# Patient Record
(demographics unavailable — no encounter records)

---

## 2024-10-10 NOTE — PHYSICAL EXAM
[No Acute Distress] : no acute distress [Well Nourished] : well nourished [Well Developed] : well developed [Well-Appearing] : well-appearing [Normal Sclera/Conjunctiva] : normal sclera/conjunctiva [PERRL] : pupils equal round and reactive to light [EOMI] : extraocular movements intact [Normal Oropharynx] : the oropharynx was normal [No JVD] : no jugular venous distention [No Lymphadenopathy] : no lymphadenopathy [Supple] : supple [Thyroid Normal, No Nodules] : the thyroid was normal and there were no nodules present [No Respiratory Distress] : no respiratory distress  [No Accessory Muscle Use] : no accessory muscle use [Clear to Auscultation] : lungs were clear to auscultation bilaterally [Normal Rate] : normal rate  [Regular Rhythm] : with a regular rhythm [Normal S1, S2] : normal S1 and S2 [No Murmur] : no murmur heard [No Carotid Bruits] : no carotid bruits [No Abdominal Bruit] : a ~M bruit was not heard ~T in the abdomen [No Varicosities] : no varicosities [Pedal Pulses Present] : the pedal pulses are present [No Edema] : there was no peripheral edema [No Palpable Aorta] : no palpable aorta [No Extremity Clubbing/Cyanosis] : no extremity clubbing/cyanosis [Soft] : abdomen soft [Non Tender] : non-tender [Non-distended] : non-distended [No Masses] : no abdominal mass palpated [No HSM] : no HSM [Normal Bowel Sounds] : normal bowel sounds [Normal Posterior Cervical Nodes] : no posterior cervical lymphadenopathy [Normal Anterior Cervical Nodes] : no anterior cervical lymphadenopathy [No CVA Tenderness] : no CVA  tenderness [No Spinal Tenderness] : no spinal tenderness [No Joint Swelling] : no joint swelling [Grossly Normal Strength/Tone] : grossly normal strength/tone [No Rash] : no rash [Coordination Grossly Intact] : coordination grossly intact [No Focal Deficits] : no focal deficits [Normal Gait] : normal gait [Deep Tendon Reflexes (DTR)] : deep tendon reflexes were 2+ and symmetric [Normal Affect] : the affect was normal [Normal Insight/Judgement] : insight and judgment were intact [de-identified] : clear nasal discharge

## 2024-10-10 NOTE — PHYSICAL EXAM
[No Acute Distress] : no acute distress [Well Nourished] : well nourished [Well Developed] : well developed [Well-Appearing] : well-appearing [Normal Sclera/Conjunctiva] : normal sclera/conjunctiva [PERRL] : pupils equal round and reactive to light [EOMI] : extraocular movements intact [Normal Oropharynx] : the oropharynx was normal [No JVD] : no jugular venous distention [No Lymphadenopathy] : no lymphadenopathy [Supple] : supple [Thyroid Normal, No Nodules] : the thyroid was normal and there were no nodules present [No Respiratory Distress] : no respiratory distress  [No Accessory Muscle Use] : no accessory muscle use [Clear to Auscultation] : lungs were clear to auscultation bilaterally [Normal Rate] : normal rate  [Regular Rhythm] : with a regular rhythm [Normal S1, S2] : normal S1 and S2 [No Murmur] : no murmur heard [No Carotid Bruits] : no carotid bruits [No Abdominal Bruit] : a ~M bruit was not heard ~T in the abdomen [No Varicosities] : no varicosities [Pedal Pulses Present] : the pedal pulses are present [No Edema] : there was no peripheral edema [No Palpable Aorta] : no palpable aorta [No Extremity Clubbing/Cyanosis] : no extremity clubbing/cyanosis [Soft] : abdomen soft [Non Tender] : non-tender [Non-distended] : non-distended [No Masses] : no abdominal mass palpated [No HSM] : no HSM [Normal Bowel Sounds] : normal bowel sounds [Normal Posterior Cervical Nodes] : no posterior cervical lymphadenopathy [Normal Anterior Cervical Nodes] : no anterior cervical lymphadenopathy [No CVA Tenderness] : no CVA  tenderness [No Spinal Tenderness] : no spinal tenderness [No Joint Swelling] : no joint swelling [Grossly Normal Strength/Tone] : grossly normal strength/tone [No Rash] : no rash [Coordination Grossly Intact] : coordination grossly intact [No Focal Deficits] : no focal deficits [Normal Gait] : normal gait [Deep Tendon Reflexes (DTR)] : deep tendon reflexes were 2+ and symmetric [Normal Affect] : the affect was normal [Normal Insight/Judgement] : insight and judgment were intact [de-identified] : clear nasal discharge

## 2024-10-10 NOTE — HISTORY OF PRESENT ILLNESS
[FreeTextEntry1] : follow up [de-identified] : 10/10/24: Patient in office for flu shot & Metformin refill. Patient is in her usual state of health w/ no reported complaints. Is coming in for CPE in December  Patient was evaluated and treated for URI symptoms at  on 7/25. Symptoms are improving, and patient covid neg by group home, Today, however, multiple people have been found positive in the group home Patient has loss of taste. Patient has mild uri symptoms

## 2024-10-10 NOTE — HISTORY OF PRESENT ILLNESS
[FreeTextEntry1] : follow up [de-identified] : 10/10/24: Patient in office for flu shot & Metformin refill. Patient is in her usual state of health w/ no reported complaints. Is coming in for CPE in December  Patient was evaluated and treated for URI symptoms at  on 7/25. Symptoms are improving, and patient covid neg by group home, Today, however, multiple people have been found positive in the group home Patient has loss of taste. Patient has mild uri symptoms

## 2024-12-19 NOTE — HEALTH RISK ASSESSMENT
[No] : No [Good] : ~his/her~  mood as  good [No falls in past year] : Patient reported no falls in the past year [0] : 2) Feeling down, depressed, or hopeless: Not at all (0) [de-identified] : limited [de-identified] : ok [Never] : Never [NO] : No [Change in mental status noted] : No change in mental status noted [Language] : denies difficulty with language [Handling Complex Tasks] : denies difficulty handling complex tasks [Single] : single [Sexually Active] : not sexually active [Feels Safe at Home] : Feels safe at home [Fully functional (bathing, dressing, toileting, transferring, walking, feeding)] : Fully functional (bathing, dressing, toileting, transferring, walking, feeding) [Reports changes in hearing] : Reports no changes in hearing [Reports changes in vision] : Reports no changes in vision [Reports changes in dental health] : Reports no changes in dental health [Travel to Developing Areas] : does not  travel to developing areas [MammogramDate] : 02/24 [BoneDensityDate] : 10/22 [de-identified] : in group home [de-identified] : in group home

## 2024-12-19 NOTE — HEALTH RISK ASSESSMENT
[No] : No [Good] : ~his/her~  mood as  good [No falls in past year] : Patient reported no falls in the past year [0] : 2) Feeling down, depressed, or hopeless: Not at all (0) [de-identified] : limited [de-identified] : ok [Never] : Never [NO] : No [Change in mental status noted] : No change in mental status noted [Language] : denies difficulty with language [Handling Complex Tasks] : denies difficulty handling complex tasks [Single] : single [Sexually Active] : not sexually active [Feels Safe at Home] : Feels safe at home [Fully functional (bathing, dressing, toileting, transferring, walking, feeding)] : Fully functional (bathing, dressing, toileting, transferring, walking, feeding) [Reports changes in hearing] : Reports no changes in hearing [Reports changes in vision] : Reports no changes in vision [Reports changes in dental health] : Reports no changes in dental health [Travel to Developing Areas] : does not  travel to developing areas [MammogramDate] : 02/24 [BoneDensityDate] : 10/22 [de-identified] : in group home [de-identified] : in group home

## 2024-12-19 NOTE — PHYSICAL EXAM
[No Acute Distress] : no acute distress [Well Nourished] : well nourished [Well Developed] : well developed [Well-Appearing] : well-appearing [Normal Sclera/Conjunctiva] : normal sclera/conjunctiva [PERRL] : pupils equal round and reactive to light [EOMI] : extraocular movements intact [Normal Outer Ear/Nose] : the outer ears and nose were normal in appearance [Normal Oropharynx] : the oropharynx was normal [Normal TMs] : both tympanic membranes were normal [No JVD] : no jugular venous distention [No Lymphadenopathy] : no lymphadenopathy [Supple] : supple [Thyroid Normal, No Nodules] : the thyroid was normal and there were no nodules present [No Respiratory Distress] : no respiratory distress  [No Accessory Muscle Use] : no accessory muscle use [Clear to Auscultation] : lungs were clear to auscultation bilaterally [Normal Rate] : normal rate  [Regular Rhythm] : with a regular rhythm [Normal S1, S2] : normal S1 and S2 [No Murmur] : no murmur heard [No Abdominal Bruit] : a ~M bruit was not heard ~T in the abdomen [No Varicosities] : no varicosities [Pedal Pulses Present] : the pedal pulses are present [No Edema] : there was no peripheral edema [No Palpable Aorta] : no palpable aorta [No Extremity Clubbing/Cyanosis] : no extremity clubbing/cyanosis [Normal Appearance] : normal in appearance [No Nipple Discharge] : no nipple discharge [No Axillary Lymphadenopathy] : no axillary lymphadenopathy [Soft] : abdomen soft [Non Tender] : non-tender [Non-distended] : non-distended [No Masses] : no abdominal mass palpated [No HSM] : no HSM [Normal Bowel Sounds] : normal bowel sounds [Normal Supraclavicular Nodes] : no supraclavicular lymphadenopathy [Normal Axillary Nodes] : no axillary lymphadenopathy [Normal Posterior Cervical Nodes] : no posterior cervical lymphadenopathy [Normal Anterior Cervical Nodes] : no anterior cervical lymphadenopathy [No CVA Tenderness] : no CVA  tenderness [No Spinal Tenderness] : no spinal tenderness [No Joint Swelling] : no joint swelling [Grossly Normal Strength/Tone] : grossly normal strength/tone [No Rash] : no rash [Coordination Grossly Intact] : coordination grossly intact [No Focal Deficits] : no focal deficits [Normal Gait] : normal gait [Normal Affect] : the affect was normal [Alert and Oriented x3] : oriented to person, place, and time [Normal Insight/Judgement] : insight and judgment were intact [de-identified] : tremors

## 2024-12-19 NOTE — HISTORY OF PRESENT ILLNESS
[Formal Caregiver] : formal caregiver [FreeTextEntry1] : 77 year old female who presents today for a complete physical exam. states has been having reduced appetite; attributes to recent uri states hx of seasonal allergies feeling well overall today

## 2024-12-19 NOTE — PHYSICAL EXAM
[No Acute Distress] : no acute distress [Well Nourished] : well nourished [Well Developed] : well developed [Well-Appearing] : well-appearing [Normal Sclera/Conjunctiva] : normal sclera/conjunctiva [PERRL] : pupils equal round and reactive to light [EOMI] : extraocular movements intact [Normal Outer Ear/Nose] : the outer ears and nose were normal in appearance [Normal Oropharynx] : the oropharynx was normal [Normal TMs] : both tympanic membranes were normal [No JVD] : no jugular venous distention [No Lymphadenopathy] : no lymphadenopathy [Supple] : supple [Thyroid Normal, No Nodules] : the thyroid was normal and there were no nodules present [No Respiratory Distress] : no respiratory distress  [No Accessory Muscle Use] : no accessory muscle use [Clear to Auscultation] : lungs were clear to auscultation bilaterally [Normal Rate] : normal rate  [Regular Rhythm] : with a regular rhythm [Normal S1, S2] : normal S1 and S2 [No Murmur] : no murmur heard [No Abdominal Bruit] : a ~M bruit was not heard ~T in the abdomen [No Varicosities] : no varicosities [Pedal Pulses Present] : the pedal pulses are present [No Edema] : there was no peripheral edema [No Palpable Aorta] : no palpable aorta [No Extremity Clubbing/Cyanosis] : no extremity clubbing/cyanosis [Normal Appearance] : normal in appearance [No Nipple Discharge] : no nipple discharge [No Axillary Lymphadenopathy] : no axillary lymphadenopathy [Soft] : abdomen soft [Non Tender] : non-tender [Non-distended] : non-distended [No Masses] : no abdominal mass palpated [No HSM] : no HSM [Normal Bowel Sounds] : normal bowel sounds [Normal Supraclavicular Nodes] : no supraclavicular lymphadenopathy [Normal Axillary Nodes] : no axillary lymphadenopathy [Normal Posterior Cervical Nodes] : no posterior cervical lymphadenopathy [Normal Anterior Cervical Nodes] : no anterior cervical lymphadenopathy [No CVA Tenderness] : no CVA  tenderness [No Spinal Tenderness] : no spinal tenderness [No Joint Swelling] : no joint swelling [Grossly Normal Strength/Tone] : grossly normal strength/tone [No Rash] : no rash [Coordination Grossly Intact] : coordination grossly intact [No Focal Deficits] : no focal deficits [Normal Gait] : normal gait [Normal Affect] : the affect was normal [Alert and Oriented x3] : oriented to person, place, and time [Normal Insight/Judgement] : insight and judgment were intact [de-identified] : tremors

## 2025-01-29 NOTE — ASSESSMENT
[FreeTextEntry1] : - For acute pain will administer cortisone injections to the right greater trochanteric bursa distribution -Ice and as needed mobic (Rx provided)   The patient's orthopaedic condition(s) warrants intermittent use of a prescription strength non-steroidal anti-inflammatory medication.  These medications are associated with risks including but not limited to gastrointestinal irritation, kidney damage, hypertension, and bleeding.  The patient understands and will take medications as prescribed.  The patient will stop the medication and consult a physician as needed if problems arise. Follow-up as needed

## 2025-01-29 NOTE — IMAGING
[Disc space narrowing] : Disc space narrowing [FreeTextEntry1] : Multilevel degenerative findings with narrowing and anterior osteophyte formation noted from L 2-L5-S1 [de-identified] : normal

## 2025-01-29 NOTE — HISTORY OF PRESENT ILLNESS
[de-identified] :  01/29/2025: Known history of lumbar DDD and right hip bursitis.  She has seen Dr. Jerez in the past and did well with a cortisone injection to the trochanteric bursal region.  She states 1 day history of pain in the right greater trochanteric distribution and she is ambulating with a limp. She presents with a caregiver and she is requesting a cortisone injection to the right hip today [FreeTextEntry1] : Right hip [FreeTextEntry5] : Patient complains of right hip pain since early this morning. no specific injury. uses a walker to ambulate. H/O Bursitis in the right hip.

## 2025-01-30 NOTE — HISTORY OF PRESENT ILLNESS
[FreeTextEntry1] : The patient is here for a routine gynecological examination with Pap smear.  Her LMP was over ten years   She had polypectomy with benign pathology  No PMB since     She has no recent gynecological or urinary problems

## 2025-01-30 NOTE — DISCUSSION/SUMMARY
[FreeTextEntry1] : General measures for good overall health are discussed with patient.   She is advised to follow three pathways to assure the best chances for overall health for now and the future.   First:  She must make sure that any current condition is treated promptly and properly.  If she has chronic issues such as hypothyroid, hypertension, diabetes, hyperlipidemia, etc. she should take the prescribed medications carefully and follow any other recommendations of the physician treating the problem.  If there are new issues, such as UTI or other infections or injuries or new disease, treatment should be without delay and completed as per physician instructions.  Treating current illness properly reduces the harm to health  Second:  If there are diseases that cannot be prevented but can be diagnosed early for effective treatment any testing schedules for these diseases should be followed.  This would include mammogram for breast cancer, Pap smear for cervical cancer,, body skin evaluations for skin cancer, eye exams for glaucoma and other eye conditions, colonoscopy for colon cancer, bone density testing for osteoporosis transvaginal sonogram for uterine and ovarian disease and others.  It is emphasized that although prevention may not be possible, many of these conditions can be effectively treated and/or cured with early diagnosis.   Finally, Healthy Lifestyle is important to prevent or reduce the risk of disease.  Healthy lifestyle includes avoiding habits that cause disease such and tobacco, drugs, and alcoholism.   Positive lifestyle habits include exercise and activity which can be in or out of a gym.  There is almost no limit to daily activity, and men and women who maintain an active lifestyle live longer and healthier, feel better, and look better.  Nutrition is very important and is like fuel for a machine.  Good fuel helps a machine run better, and healthy diet makes the body run better.  The pyramid of foods is a good guide to healthy eating.  Rest at night, with or without sleep, is important and one should be in bed for at least 7 hours per night.   Teeth and gums much be cared for both at the Dentist office and at home.  All questions are answered about preventing and treating disease

## 2025-02-04 NOTE — PHYSICAL EXAM
[No Acute Distress] : no acute distress [No Respiratory Distress] : no respiratory distress  [No Accessory Muscle Use] : no accessory muscle use [Clear to Auscultation] : lungs were clear to auscultation bilaterally [Normal Rate] : normal rate  [Regular Rhythm] : with a regular rhythm [Normal S1, S2] : normal S1 and S2 [No Murmur] : no murmur heard [No Edema] : there was no peripheral edema [Alert and Oriented x3] : oriented to person, place, and time

## 2025-02-04 NOTE — ASSESSMENT
[FreeTextEntry1] : # T2DM  last A1c 6.5 Continue Metformin  # HLD  stable on simvastatin and fenofibrate # mood disorder # anxiety  meds as per psych  # Parkinson's  f/u with neuro.

## 2025-02-04 NOTE — HISTORY OF PRESENT ILLNESS
[FreeTextEntry1] : follow up and BW  [de-identified] : Ms. BROOK WOODWARD is a 77 year old female presents today for follow and BW Accompanied by group home assistant.  Reports doing well.

## 2025-02-04 NOTE — HISTORY OF PRESENT ILLNESS
[FreeTextEntry1] : follow up and BW  [de-identified] : Ms. BROOK WOODWARD is a 77 year old female presents today for follow and BW Accompanied by group home assistant.  Reports doing well.

## 2025-05-19 NOTE — HISTORY OF PRESENT ILLNESS
[FreeTextEntry1] : chest pain  [de-identified] : Ms. BROOK WOODWARD is a 78-year-old female presents today for evaluation of chest pain.  Accompanied by group home assistant.  Pt reports an episode of non-radiating midsternal chest pain last night. Reports pain woke her up from sleep, resolved itself after an hour.  Pt reports + palpitations. Denies dizziness, syncope, SOB, RAMOS.  Pt asymptomatic now.

## 2025-05-19 NOTE — PHYSICAL EXAM
[No Acute Distress] : no acute distress [Well-Appearing] : well-appearing [No JVD] : no jugular venous distention [No Respiratory Distress] : no respiratory distress  [No Accessory Muscle Use] : no accessory muscle use [Clear to Auscultation] : lungs were clear to auscultation bilaterally [Normal Rate] : normal rate  [Regular Rhythm] : with a regular rhythm [Normal S1, S2] : normal S1 and S2 [No Carotid Bruits] : no carotid bruits [No Edema] : there was no peripheral edema [Normal Affect] : the affect was normal [Alert and Oriented x3] : oriented to person, place, and time [Normal Insight/Judgement] : insight and judgment were intact [Soft] : abdomen soft [Non Tender] : non-tender [Non-distended] : non-distended [No Masses] : no abdominal mass palpated

## 2025-05-19 NOTE — HISTORY OF PRESENT ILLNESS
[FreeTextEntry1] : chest pain  [de-identified] : Ms. BROOK WOODWARD is a 78-year-old female presents today for evaluation of chest pain.  Accompanied by group home assistant.  Pt reports an episode of non-radiating midsternal chest pain last night. Reports pain woke her up from sleep, resolved itself after an hour.  Pt reports + palpitations. Denies dizziness, syncope, SOB, RAMOS.  Pt asymptomatic now.

## 2025-05-19 NOTE — HISTORY OF PRESENT ILLNESS
[FreeTextEntry1] : chest pain  [de-identified] : Ms. BROOK WOODWARD is a 78-year-old female presents today for evaluation of chest pain.  Accompanied by group home assistant.  Pt reports an episode of non-radiating midsternal chest pain last night. Reports pain woke her up from sleep, resolved itself after an hour.  Pt reports + palpitations. Denies dizziness, syncope, SOB, RAMOS.  Pt asymptomatic now.

## 2025-05-19 NOTE — ASSESSMENT
[FreeTextEntry1] : # chest pain, atypical  Counseled on heart healthy diet and exercise EKG - Sinus bradycardia Cardiology referral  Advised to go to ER if symptoms of CP, palpitations, SOB, RAMOS.

## 2025-05-21 NOTE — HISTORY OF PRESENT ILLNESS
[FreeTextEntry1] :   Ms. BROOK WOODWARD is a pleasant 78-year-old woman with history of mild intellectual disability, essential tremor, HTN, DMII, HLD, prior TIA, OA, presents for evaluation of chest pain.   She is accompanied by a group home aide.  She reports an episode of midsternal chest pain and palpitations that woke her up from sleep 2 weeks ago.  These symptoms lasted for about 10-15 min and resolved spontaneously.  She did not seek medical attention at the time.  Currently, she feels well.  Patient denies any chest pain, shortness of breath, palpitations, dizziness or syncope.  Denies lower extremity edema, orthopnea or paroxysmal nocturnal dyspnea.  She ambulates with a walker, participates in low level exercise.

## 2025-05-21 NOTE — PHYSICAL EXAM
[Well Developed] : well developed [Well Nourished] : well nourished [No Acute Distress] : no acute distress [Normal Conjunctiva] : normal conjunctiva [Normal Venous Pressure] : normal venous pressure [No Carotid Bruit] : no carotid bruit [Normal S1, S2] : normal S1, S2 [No Murmur] : no murmur [No Rub] : no rub [No Gallop] : no gallop [Clear Lung Fields] : clear lung fields [Good Air Entry] : good air entry [No Respiratory Distress] : no respiratory distress  [Soft] : abdomen soft [Non Tender] : non-tender [No Masses/organomegaly] : no masses/organomegaly [Normal Bowel Sounds] : normal bowel sounds [Normal Gait] : normal gait [No Edema] : no edema [No Cyanosis] : no cyanosis [No Clubbing] : no clubbing [No Varicosities] : no varicosities [No Rash] : no rash [No Skin Lesions] : no skin lesions [Moves all extremities] : moves all extremities [No Focal Deficits] : no focal deficits [Normal Speech] : normal speech [Alert and Oriented] : alert and oriented [Normal memory] : normal memory [de-identified] : Elderly White woman  [de-identified] : RRR

## 2025-06-03 NOTE — HISTORY OF PRESENT ILLNESS
Spoke with Garrett in intake at [facility] and he states there are no beds available at [facility]. [facility]   [Other: _____] : [unfilled] [FreeTextEntry1] : follow up after ER visit.  [de-identified] : Ms. BROOK WOODWARD is a 78-year-old female presents today for follow up after ER visit for chest pain.   Seen at [facility] ER for chest pain on 5/27/25. BW, CXR and EKG were unremarkable.  Reports doing well now. Denies any complaints.  Seen by cardiologist on 5/21. has pending appt for TTE and stress test.

## 2025-06-03 NOTE — HISTORY OF PRESENT ILLNESS
[Other: _____] : [unfilled] [FreeTextEntry1] : follow up after ER visit.  [de-identified] : Ms. BROOK WOODWARD is a 78-year-old female presents today for follow up after ER visit for chest pain.   Seen at [facility] ER for chest pain on 5/27/25. BW, CXR and EKG were unremarkable.  Reports doing well now. Denies any complaints.  Seen by cardiologist on 5/21. has pending appt for TTE and stress test.

## 2025-06-03 NOTE — ASSESSMENT
[FreeTextEntry1] : Counseled  Continue current meds  advised heart healthy diet and exercise Cardiology follow up

## 2025-06-03 NOTE — PHYSICAL EXAM
[No Acute Distress] : no acute distress [Well-Appearing] : well-appearing [No Respiratory Distress] : no respiratory distress  [No Accessory Muscle Use] : no accessory muscle use [Clear to Auscultation] : lungs were clear to auscultation bilaterally [Normal Rate] : normal rate  [Regular Rhythm] : with a regular rhythm [Normal S1, S2] : normal S1 and S2 [No Murmur] : no murmur heard [No Edema] : there was no peripheral edema [Alert and Oriented x3] : oriented to person, place, and time

## 2025-06-23 NOTE — CARDIOLOGY SUMMARY
[de-identified] : 5/21/2025 NSR 6/5/2025 NSR [de-identified] : MCT 5/21/-5/24/2025 - No arrhythmic events, no symptoms.  [de-identified] : Stress MPI 76/5/2025 - normal perfusion [de-identified] : 6/12/2025 - Normal LVEF, GIDD, borderline aortic root size.

## 2025-06-23 NOTE — HISTORY OF PRESENT ILLNESS
[FreeTextEntry1] :   Ms. BROOK WOODWARD is a pleasant 78-year-old woman with history of mild intellectual disability, essential tremor, HTN, DMII, HLD, prior TIA, OA, presents for follow-up of chest pain, palpitations.   She is accompanied by a group home aide.  Previously, she reported an episode of midsternal chest pain and palpitations that woke her up from sleep, lasted for about 10-15 min and resolved spontaneously.  Since then, she had another episode of chest pain, described as right sided, non-exertional, 10 min duration, evaluated at [facility] ER.  Records reviewed - normal troponin level.  Currently, she feels well.  No chest pain episodes since last visit to ER.   Patient denies any shortness of breath, palpitations, dizziness or syncope.  Denies lower extremity edema, orthopnea or paroxysmal nocturnal dyspnea.  She ambulates with a walker, participates in low level exercise.

## 2025-06-23 NOTE — PHYSICAL EXAM
[Well Developed] : well developed [Well Nourished] : well nourished [No Acute Distress] : no acute distress [Normal Conjunctiva] : normal conjunctiva [Normal Venous Pressure] : normal venous pressure [No Carotid Bruit] : no carotid bruit [Normal S1, S2] : normal S1, S2 [No Murmur] : no murmur [No Rub] : no rub [No Gallop] : no gallop [Clear Lung Fields] : clear lung fields [Good Air Entry] : good air entry [No Respiratory Distress] : no respiratory distress  [Soft] : abdomen soft [Non Tender] : non-tender [No Masses/organomegaly] : no masses/organomegaly [Normal Bowel Sounds] : normal bowel sounds [Normal Gait] : normal gait [No Edema] : no edema [No Cyanosis] : no cyanosis [No Clubbing] : no clubbing [No Varicosities] : no varicosities [No Rash] : no rash [No Skin Lesions] : no skin lesions [Moves all extremities] : moves all extremities [No Focal Deficits] : no focal deficits [Normal Speech] : normal speech [Alert and Oriented] : alert and oriented [Normal memory] : normal memory [de-identified] : Elderly White woman  [de-identified] : RRR

## 2025-06-23 NOTE — DISCUSSION/SUMMARY
[Patient] : the patient [___ Month(s)] : in [unfilled] month(s) [FreeTextEntry1] : Negative work-up so far.  Echocardiogram still pending.  Will call patient with results.